# Patient Record
Sex: MALE | Race: BLACK OR AFRICAN AMERICAN
[De-identification: names, ages, dates, MRNs, and addresses within clinical notes are randomized per-mention and may not be internally consistent; named-entity substitution may affect disease eponyms.]

---

## 2018-01-13 ENCOUNTER — HOSPITAL ENCOUNTER (EMERGENCY)
Dept: HOSPITAL 57 - BURERS | Age: 43
Discharge: HOME | End: 2018-01-13
Payer: SELF-PAY

## 2018-01-13 DIAGNOSIS — N13.2: Primary | ICD-10-CM

## 2018-01-13 DIAGNOSIS — F17.210: ICD-10-CM

## 2018-01-13 LAB
ALBUMIN SERPL BCG-MCNC: 3.9 G/DL (ref 3.5–5)
ALP SERPL-CCNC: 89 U/L (ref 40–150)
ALT SERPL W P-5'-P-CCNC: 14 U/L (ref 8–55)
ANION GAP SERPL CALC-SCNC: 15 MMOL/L (ref 10–20)
APTT PPP: 25.2 SEC (ref 22.9–36.1)
AST SERPL-CCNC: 16 U/L (ref 5–34)
BACTERIA UR QL AUTO: (no result) HPF
BASOPHILS # BLD AUTO: 0 THOU/UL (ref 0–0.2)
BASOPHILS NFR BLD AUTO: 0.6 % (ref 0–1)
BILIRUB SERPL-MCNC: 0.3 MG/DL (ref 0.2–1.2)
BUN SERPL-MCNC: 10 MG/DL (ref 8.9–20.6)
CALCIUM SERPL-MCNC: 9 MG/DL (ref 7.8–10.44)
CHLORIDE SERPL-SCNC: 108 MMOL/L (ref 98–107)
CK MB SERPL-MCNC: 0.8 NG/ML (ref 0–6.6)
CO2 SERPL-SCNC: 22 MMOL/L (ref 22–29)
CREAT CL PREDICTED SERPL C-G-VRATE: 0 ML/MIN (ref 70–130)
EOSINOPHIL # BLD AUTO: 0.1 THOU/UL (ref 0–0.7)
EOSINOPHIL NFR BLD AUTO: 2.4 % (ref 0–10)
GLOBULIN SER CALC-MCNC: 3.9 G/DL (ref 2.4–3.5)
GLUCOSE SERPL-MCNC: 98 MG/DL (ref 70–105)
HGB BLD-MCNC: 14 G/DL (ref 14–18)
INR PPP: 1
LIPASE SERPL-CCNC: 23 U/L (ref 8–78)
LYMPHOCYTES # BLD AUTO: 0.6 THOU/UL (ref 1.2–3.4)
LYMPHOCYTES NFR BLD AUTO: 10.3 % (ref 21–51)
MCH RBC QN AUTO: 28.7 PG (ref 27–31)
MCV RBC AUTO: 83.3 FL (ref 80–94)
MONOCYTES # BLD AUTO: 0.3 THOU/UL (ref 0.11–0.59)
MONOCYTES NFR BLD AUTO: 5.6 % (ref 0–10)
NEUTROPHILS # BLD AUTO: 4.8 THOU/UL (ref 1.4–6.5)
NEUTROPHILS NFR BLD AUTO: 81.1 % (ref 42–75)
PLATELET # BLD AUTO: 138 THOU/UL (ref 130–400)
POTASSIUM SERPL-SCNC: 3.8 MMOL/L (ref 3.5–5.1)
PROT UR STRIP.AUTO-MCNC: 30 MG/DL
PROTHROMBIN TIME: 13.3 SEC (ref 12–14.7)
RBC # BLD AUTO: 4.89 MILL/UL (ref 4.7–6.1)
RBC UR QL AUTO: (no result) HPF (ref 0–3)
SODIUM SERPL-SCNC: 141 MMOL/L (ref 136–145)
SP GR UR STRIP: 1.02 (ref 1–1.03)
TROPONIN I SERPL DL<=0.01 NG/ML-MCNC: (no result) NG/ML (ref ?–0.03)
WBC # BLD AUTO: 5.9 THOU/UL (ref 4.8–10.8)
WBC UR QL AUTO: (no result) HPF (ref 0–3)

## 2018-01-13 PROCEDURE — 85610 PROTHROMBIN TIME: CPT

## 2018-01-13 PROCEDURE — 74176 CT ABD & PELVIS W/O CONTRAST: CPT

## 2018-01-13 PROCEDURE — 85730 THROMBOPLASTIN TIME PARTIAL: CPT

## 2018-01-13 PROCEDURE — 84484 ASSAY OF TROPONIN QUANT: CPT

## 2018-01-13 PROCEDURE — 94760 N-INVAS EAR/PLS OXIMETRY 1: CPT

## 2018-01-13 PROCEDURE — 82553 CREATINE MB FRACTION: CPT

## 2018-01-13 PROCEDURE — 81015 MICROSCOPIC EXAM OF URINE: CPT

## 2018-01-13 PROCEDURE — 96375 TX/PRO/DX INJ NEW DRUG ADDON: CPT

## 2018-01-13 PROCEDURE — 83690 ASSAY OF LIPASE: CPT

## 2018-01-13 PROCEDURE — 36415 COLL VENOUS BLD VENIPUNCTURE: CPT

## 2018-01-13 PROCEDURE — 81003 URINALYSIS AUTO W/O SCOPE: CPT

## 2018-01-13 PROCEDURE — 96374 THER/PROPH/DIAG INJ IV PUSH: CPT

## 2018-01-13 PROCEDURE — 85025 COMPLETE CBC W/AUTO DIFF WBC: CPT

## 2018-01-13 PROCEDURE — 80053 COMPREHEN METABOLIC PANEL: CPT

## 2018-01-13 NOTE — CT
CT OF THE ABDOMEN AND PELVIS WITHOUT CONTRAST:

 

Date:  01/13/18

 

Spiral CT of the abdomen and pelvis was done for evaluation of left lower quadrant pain and urinary u
rgency. Axial slices were acquired, then coronal reconstructions were done. 

 

FINDINGS:

Lung bases show some dependent atelectasis, but no lobar consolidations. The liver, spleen, pancreas,
 gallbladder, adrenal glands, and abdominal aorta are unremarkable. 

 

There is some very mild left hydronephrosis. Although difficult to see well, there does appear to be 
a very tiny, 2.0 mm, calculus in the distal left ureter, best seen on slice 87 of the axial images an
d slice 79 of the coronals. It is just a centimeter or two short of the left UPJ. No renal calculi ap
preciated. 

 

The bowel shows no distention or sign of obstruction or bowel wall thickening. There are no inflammat
ory changes around bowel. There has probably been a prior appendectomy. No free air or free fluid see
n. 

 

IMPRESSION: 

Presumed 2.0 mm distal left ureteral calculus near the UVJ causing mild left hydronephrosis. 

 

 

POS: HOME

## 2019-08-30 ENCOUNTER — HOSPITAL ENCOUNTER (OUTPATIENT)
Dept: HOSPITAL 92 - CT | Age: 44
Discharge: HOME | End: 2019-08-30
Attending: STUDENT IN AN ORGANIZED HEALTH CARE EDUCATION/TRAINING PROGRAM
Payer: COMMERCIAL

## 2019-08-30 DIAGNOSIS — R05: ICD-10-CM

## 2019-08-30 DIAGNOSIS — B20: Primary | ICD-10-CM

## 2019-08-30 PROCEDURE — 71046 X-RAY EXAM CHEST 2 VIEWS: CPT

## 2019-08-30 NOTE — RAD
RADIOGRAPH CHEST 2 VIEWS:



DATE:

8/30/2019



HISTORY:

43-year-old male with HIV presents with cough.



FINDINGS:

The lungs are clear. The cardiomediastinal silhouette and hilar shadows appear normal. There is no pl
eural effusion or pneumothorax. No osseous abnormality is identified.



IMPRESSION:

Normal



Reported By: Gerardo Holcomb 

Electronically Signed:  8/30/2019 10:21 AM

## 2019-09-05 ENCOUNTER — HOSPITAL ENCOUNTER (INPATIENT)
Dept: HOSPITAL 92 - ERS | Age: 44
LOS: 4 days | Discharge: HOME | DRG: 975 | End: 2019-09-09
Attending: FAMILY MEDICINE | Admitting: FAMILY MEDICINE
Payer: COMMERCIAL

## 2019-09-05 VITALS — BODY MASS INDEX: 23.1 KG/M2

## 2019-09-05 DIAGNOSIS — D64.9: ICD-10-CM

## 2019-09-05 DIAGNOSIS — E46: ICD-10-CM

## 2019-09-05 DIAGNOSIS — C46.9: ICD-10-CM

## 2019-09-05 DIAGNOSIS — F17.210: ICD-10-CM

## 2019-09-05 DIAGNOSIS — B35.0: ICD-10-CM

## 2019-09-05 DIAGNOSIS — Z90.49: ICD-10-CM

## 2019-09-05 DIAGNOSIS — L21.0: ICD-10-CM

## 2019-09-05 DIAGNOSIS — Z60.2: ICD-10-CM

## 2019-09-05 DIAGNOSIS — B20: Primary | ICD-10-CM

## 2019-09-05 LAB
ALBUMIN SERPL BCG-MCNC: 3.9 G/DL (ref 3.5–5)
ALP SERPL-CCNC: 84 U/L (ref 40–150)
ALT SERPL W P-5'-P-CCNC: 12 U/L (ref 8–55)
ANION GAP SERPL CALC-SCNC: 14 MMOL/L (ref 10–20)
AST SERPL-CCNC: 22 U/L (ref 5–34)
BASOPHILS # BLD AUTO: 0 THOU/UL (ref 0–0.2)
BASOPHILS NFR BLD AUTO: 0.4 % (ref 0–1)
BILIRUB SERPL-MCNC: 0.4 MG/DL (ref 0.2–1.2)
BUN SERPL-MCNC: 9 MG/DL (ref 8.9–20.6)
CALCIUM SERPL-MCNC: 9.7 MG/DL (ref 7.8–10.44)
CHLORIDE SERPL-SCNC: 104 MMOL/L (ref 98–107)
CO2 SERPL-SCNC: 22 MMOL/L (ref 22–29)
CREAT CL PREDICTED SERPL C-G-VRATE: 0 ML/MIN (ref 70–130)
EOSINOPHIL # BLD AUTO: 0.1 THOU/UL (ref 0–0.7)
EOSINOPHIL NFR BLD AUTO: 2.4 % (ref 0–10)
GLOBULIN SER CALC-MCNC: 6.4 G/DL (ref 2.4–3.5)
GLUCOSE SERPL-MCNC: 85 MG/DL (ref 70–105)
HGB BLD-MCNC: 10.8 G/DL (ref 14–18)
HGB BLD-MCNC: 11.9 G/DL (ref 14–18)
LYMPHOCYTES # BLD: 0.7 THOU/UL (ref 1.2–3.4)
LYMPHOCYTES NFR BLD AUTO: 22.7 % (ref 21–51)
MCH RBC QN AUTO: 28.2 PG (ref 27–31)
MCH RBC QN AUTO: 28.3 PG (ref 27–31)
MCV RBC AUTO: 81.8 FL (ref 78–98)
MCV RBC AUTO: 83.7 FL (ref 78–98)
MDIFF COMPLETE?: YES
MONOCYTES # BLD AUTO: 0.4 THOU/UL (ref 0.11–0.59)
MONOCYTES NFR BLD AUTO: 12.2 % (ref 0–10)
MUCOUS THREADS UR QL AUTO: (no result) LPF
NEUTROPHILS # BLD AUTO: 2 THOU/UL (ref 1.4–6.5)
NEUTROPHILS NFR BLD AUTO: 62.3 % (ref 42–75)
PLATELET # BLD AUTO: 213 THOU/UL (ref 130–400)
PLATELET # BLD AUTO: 228 THOU/UL (ref 130–400)
POTASSIUM SERPL-SCNC: 4 MMOL/L (ref 3.5–5.1)
PROT UR STRIP.AUTO-MCNC: 30 MG/DL
RBC # BLD AUTO: 3.84 MILL/UL (ref 4.7–6.1)
RBC # BLD AUTO: 4.21 MILL/UL (ref 4.7–6.1)
SODIUM SERPL-SCNC: 136 MMOL/L (ref 136–145)
WBC # BLD AUTO: 3.2 THOU/UL (ref 4.8–10.8)
WBC # BLD AUTO: 4.2 THOU/UL (ref 4.8–10.8)
WBC UR QL AUTO: (no result) HPF (ref 0–3)

## 2019-09-05 PROCEDURE — 84165 PROTEIN E-PHORESIS SERUM: CPT

## 2019-09-05 PROCEDURE — 70450 CT HEAD/BRAIN W/O DYE: CPT

## 2019-09-05 PROCEDURE — 80053 COMPREHEN METABOLIC PANEL: CPT

## 2019-09-05 PROCEDURE — 87040 BLOOD CULTURE FOR BACTERIA: CPT

## 2019-09-05 PROCEDURE — 87340 HEPATITIS B SURFACE AG IA: CPT

## 2019-09-05 PROCEDURE — 85652 RBC SED RATE AUTOMATED: CPT

## 2019-09-05 PROCEDURE — 86803 HEPATITIS C AB TEST: CPT

## 2019-09-05 PROCEDURE — 86480 TB TEST CELL IMMUN MEASURE: CPT

## 2019-09-05 PROCEDURE — 87385 HISTOPLASMA CAPSUL AG IA: CPT

## 2019-09-05 PROCEDURE — 87116 MYCOBACTERIA CULTURE: CPT

## 2019-09-05 PROCEDURE — 84145 PROCALCITONIN (PCT): CPT

## 2019-09-05 PROCEDURE — 36415 COLL VENOUS BLD VENIPUNCTURE: CPT

## 2019-09-05 PROCEDURE — 85025 COMPLETE CBC W/AUTO DIFF WBC: CPT

## 2019-09-05 PROCEDURE — 83605 ASSAY OF LACTIC ACID: CPT

## 2019-09-05 PROCEDURE — 81015 MICROSCOPIC EXAM OF URINE: CPT

## 2019-09-05 PROCEDURE — 86705 HEP B CORE ANTIBODY IGM: CPT

## 2019-09-05 PROCEDURE — 85060 BLOOD SMEAR INTERPRETATION: CPT

## 2019-09-05 PROCEDURE — 93005 ELECTROCARDIOGRAM TRACING: CPT

## 2019-09-05 PROCEDURE — 87521 HEPATITIS C PROBE&RVRS TRNSC: CPT

## 2019-09-05 PROCEDURE — 87899 AGENT NOS ASSAY W/OPTIC: CPT

## 2019-09-05 PROCEDURE — 87206 SMEAR FLUORESCENT/ACID STAI: CPT

## 2019-09-05 PROCEDURE — 81003 URINALYSIS AUTO W/O SCOPE: CPT

## 2019-09-05 PROCEDURE — 86140 C-REACTIVE PROTEIN: CPT

## 2019-09-05 PROCEDURE — 86777 TOXOPLASMA ANTIBODY: CPT

## 2019-09-05 PROCEDURE — 86706 HEP B SURFACE ANTIBODY: CPT

## 2019-09-05 RX ADMIN — ONDANSETRON PRN MG: 2 INJECTION INTRAMUSCULAR; INTRAVENOUS at 22:52

## 2019-09-05 SDOH — SOCIAL STABILITY - SOCIAL INSECURITY: PROBLEMS RELATED TO LIVING ALONE: Z60.2

## 2019-09-05 NOTE — CT
CT head noncontrast



HISTORY: Dizziness. Altered mental status.



FINDINGS: There is no evidence of acute intracranial hemorrhage or infarct. The ventricles appear nor
mal in size, shape and position. There is no mass effect or shift of midline structures. Visualized

paranasal sinuses remain well-aerated.







IMPRESSION: No acute intracranial abnormalities are demonstrated.



Reported By: EDGAR Beatty 

Electronically Signed:  9/5/2019 7:49 PM

## 2019-09-05 NOTE — PDOC.FPRHP
- History of Present Illness


Chief Complaint: generalized weakness 


History of Present Illness: 





42 y/o m, recently diagnosed with HIV, CD4 count of 36, presents to the ED 

after having gradual increasing weakness and dizziness. 


Pt states he found out he had HIV last Friday. He plans to tell his family of 

his diagnosis tomorrow (9/7). 


C/o body aches, muscle cramps, constipation, nausea, dizziness and SOB. 


He states he has lost weight of 30 pounds. 


Pt states he has had sexual intercourse with men since the age of 19. He had a 

high risk partner starting X2 years ago that was incarcerated for X15 years. He 

admits to unprotected intercourse with him X1 time. 


He states he was screened for HIV in 2018 and Feb 2019 with negative results. 


Pt states he received his anti-retrovirals yesterday but has not started taking 

them yet. He has also been on bactrim this week for ppx. 


He c/o of a scaling rash in his beard and hairline that got worse with the 

bactrim use.  





- Allergies/Adverse Reactions


 Allergies











Allergy/AdvReac Type Severity Reaction Status Date / Time


 


No Known Drug Allergies Allergy   Verified 09/06/19 01:12














- Home Medications


 











 Medication  Instructions  Recorded  Confirmed  Type


 


Lamivudine/Tenofovir Disop Fum 1 tab DAILY 09/05/19 09/06/19 History





[Cimduo 300-300 mg Tablet]    


 


Promethazine [Phenergan] 1 tab ASDIR PRN 09/05/19 09/06/19 History


 


Sulfamethoxazole/Trimethoprim 1 tab BID 09/05/19 09/05/19 History





[Bactrim DS]    














- History


PMHx: benign. Recent HIV diagnosis, CD4 count 36


 


PSHx: Appendectomy, ureter stent placement 





FHx: mother and father both had HTN and DM 


 


Social: female and male sexual encounters. X10 male lifetime sexual partners. 

Several tattoos. Denies IVDA, states he smoke occasional marijuana and used 

meth by smoking one time last year. 


 








- Review of Systems


General: reports: fever/chills, weight/appetite/sleep changes (30 lbs since May 

2019), night sweats, fatigue


Eyes: denies: eye pain, vision changes


ENT: denies: nasal congestion, rhinorrhea


Respiratory: reports: shortness of breath, exercise intolerance.  denies: cough

, congestion


Cardiovascular: denies: chest pain, palpitation, edema


Gastrointestinal: reports: nausea, constipation, abdominal pain.  denies: 

vomiting, diarrhea


Genitourinary: denies: incontinence, dysuria, polyuria


Skin: reports: rashes (scaling rash in beard and hairline), lesions, itching


Musculoskeletal: reports: pain, arthritis/arthralgias


Neurological: reports: weakness.  denies: numbness, syncope, seizure





- Vital signs


BP: 126/88  HR: 108 RR: 33 Tmax: 98.0 Pox: 99% on RA  Wt: 71 kg    








- Physical Exam


Constitutional: NAD, awake, alert and oriented, other (chachetic)


HEENT: normocephalic and atraumatic, PERRLA, EOMI, conjunctiva clear, no 

scleral icterus, grossly normal vision, grossly normal hearing, MMM, oropharynx 

clear, other (poor dentition)


Neck: supple, FROM, trachea midline, no JVD


Chest: no-tender to palpation, other (0.5-1 cm circular lesions scattered over 

chest and back.)


Heart: RRR, normal S1/S2, no murmurs/rubs/gallops, pulses present, no edema


Lungs: no respiratory distress, good air movement, no wheezing, no retractions, 

other (Fine Crackles in right lower lobe)


Abdomen: soft, non-tender, bowel sounds present, no masses/distention, no 

hernias


Musculoskeletal: normal structure, normal tone, ROM grossly normal


Neurological: no focal deficit, CN II-XII intact, normal sensation


Skin: good turgor, capillary refill <2 seconds, no jaundice, other (0.5-1 cm 

circular macular and erythematous lesions scattered over back, chest, and 

extremities.)


-Skin: 





scalp and under beard erythmatous with overlying scaling skin rash. 


Heme/Lymphatic: no unusual bruising or bleeding, no purpura, no petechia


Psychiatric: normal mood and affect, good judgment and insight, intact recent 

and remote memory





FMR H&P: Results





- Labs


Result Diagrams: 


 09/05/19 23:15





 09/05/19 17:44


Lab results: 


 











WBC  3.2 thou/uL (4.8-10.8)  L  09/05/19  17:44    


 


Hgb  11.9 g/dL (14.0-18.0)  L  09/05/19  17:44    


 


Hct  35.2 % (42.0-52.0)  L  09/05/19  17:44    


 


MCV  83.7 fL (78.0-98.0)   09/05/19  17:44    


 


Plt Count  228 thou/uL (130-400)   09/05/19  17:44    


 


Neutrophils %  62.3 % (42.0-75.0)   09/05/19  17:44    


 


Sodium  136 mmol/L (136-145)   09/05/19  17:44    


 


Potassium  4.0 mmol/L (3.5-5.1)   09/05/19  17:44    


 


Chloride  104 mmol/L ()   09/05/19  17:44    


 


Carbon Dioxide  22 mmol/L (22-29)   09/05/19  17:44    


 


BUN  9 mg/dL (8.9-20.6)   09/05/19  17:44    


 


Creatinine  0.78 mg/dL (0.7-1.3)   09/05/19  17:44    


 


Glucose  85 mg/dL ()   09/05/19  17:44    


 


Calcium  9.7 mg/dL (7.8-10.44)   09/05/19  17:44    


 


Total Bilirubin  0.4 mg/dL (0.2-1.2)   09/05/19  17:44    


 


AST  22 U/L (5-34)   09/05/19  17:44    


 


ALT  12 U/L (8-55)   09/05/19  17:44    


 


Alkaline Phosphatase  84 U/L ()   09/05/19  17:44    


 


Serum Total Protein  10.3 g/dL (6.0-8.3)  H  09/05/19  17:44    


 


Albumin  3.9 g/dL (3.5-5.0)   09/05/19  17:44    


 


Urine Ketones  Negative mg/dL (Negative)   09/05/19  17:39    


 


Urine Blood  1+  (Negative)  A  09/05/19  17:39    


 


Urine Nitrite  Negative  (Negative)   09/05/19  17:39    


 


Ur Leukocyte Esterase  Negative Bj/uL (Negative)   09/05/19  17:39    


 


Urine RBC  11-20 HPF (0-3)  A  09/05/19  17:39    


 


Urine WBC  0-3 HPF (0-3)   09/05/19  17:39    


 


Ur Squamous Epith Cells  None Seen HPF (0-3)   09/05/19  17:39    


 


Urine Bacteria  None Seen HPF (None Seen)   09/05/19  17:39    








CD4 count 36 





- Radiology Interpretation


  ** CT scan - head


Status: report reviewed by me (no acute findings.)





FMR H&P: A/P





- Problem List


(1) HIV-1 (human immunodeficiency virus I)


Current Visit: Yes   Status: Acute   Code(s): B20 - HUMAN IMMUNODEFICIENCY 

VIRUS [HIV] DISEASE   





(2) AIDS (acquired immune deficiency syndrome)


Current Visit: Yes   Status: Acute   





(3) Kaposi sarcoma


Current Visit: Yes   Status: Suspected   Code(s): C46.9 - KAPOSI'S SARCOMA, 

UNSPECIFIED   





(4) Problems related to high-risk sexual behavior


Current Visit: Yes   Status: Acute   Code(s): Z72.51 - HIGH RISK HETEROSEXUAL 

BEHAVIOR   





(5) Superficial fungal infection of skin


Current Visit: Yes   Status: Acute   Code(s): B36.9 - SUPERFICIAL MYCOSIS, 

UNSPECIFIED   





- Plan





42 y/o M admitted to inpatient medical for evaluation of recent HIV diagnosis 

with low CD4 count.





1. HIV 


- Recent diagnosis 


- CD4 count 36 at [facility] lab 





2. AIDS 


- CD4 count 


- Bactrim and Aithromycin PPx 


- Ordered serum protein electrophoresis, CBC with smear, EAR and CRP to work up 

possible malignancy in immunocompromised state


- Pt needs thorough lymph node exam to evaluate for possible lymphoma 





3. Kaposi Sarcoma 


- suspected lesions scattered diffusely. 





4. High risk sexual behavior with men


-Ordered labs for Hep B, Hep C


- RPR drawn in clinic, negative 


- HIV type 1 positive. 





5. Tinea Capitus 


- Fungal infection of scalp and beard skin. 


- Topical antifungal Nystatin cream 





Code Status: full code 


Diet: Regular diet 


DVT ppx: SCD's 





Disposition/LOS: 





Pt stable 


anticipated at least 2 midnights inpatient 





FMR H&P: Upper Level





- Pertinent history





42 yo male with recent diagnosis of HIV presents with loss of appetite, 

subjective fevers at home, and overall feeling unwell. Please see intern note 

above for further information.





General: Cachectic male lying in bed, NAD


CV: RRR, no murmurs


Respiratory: CTA, no wheezing


Extremities: no edema, moves all four equally.


Skin: erythematous and scaling lesion to top of head. Scattered purple lesions 

over body. 


Psych: Anxious mood with congruent affect, tearful at times


Neuro: No focal deficits





- Plan


Date/Time: 09/05/19 2768








I, Hiram Palacios MD, have evaluated this patient and agree with findings/

plan as outlined by intern resident. Pertinent changes/additions are listed 

here.








1. AIDS 


- Will check SPEP and Hep C to determine other co-morbidities


- Consult I&D in AM


- Initiated prophylactic antibiotics





2. Anemia


- Likely chronic


- Monitor for signs and symptoms of bleeding. 


- Previously failed colonoscopy due to inadequate prep





CODE STATUS: FULL CODE





PCP: DIOGO





Disposition: Stable, will admit for further evaluation. 





Addendum - Attending





- Attending Attestation


Date/Time: 09/05/19 3521





I personally evaluated the patient and discussed the management with Dr. Reyna/

Suzanne. 


I agree with the History, Examination, Assessment and Plan documented above 

with any addition or exceptions noted below.





Patient here with progressive fatigue, night sweats, weight loss, anorexia, 

body aches, and intermittent fevers. Diagnosed in our clinic 1 week ago with HIV

, found to have CD4 count of near 30. He has not started HAART therapy yet. 

Presents due to progressive symptoms. Exam shows cachexia and scattered skin 

abnormalities that are overall stable from his clinic visit 1 week ago. He had 

CXR done outpatient that did not show evidence for pulmonary infection such as 

PJP or cavitary lesion. Patient lab work shows mild leukopenia, elevated total 

protein and globulin. Patient to be admitted for acute on chronic immune 

compromise due to AIDS. ID to be consulted. Will obtain cultures to screen for 

infection, PCT. Start Bactrim and Azithromycin for ppx but will expand to broad 

spectrum abx if any source becomes apparent. He will also need peripheral smear 

and excellent lymph node exam to screen for hematological malignancy such as 

lymphoma. Will order SPEP due to increased globulins as well as ESR/CRP. Obtain 

TB testing. Primary concerns at this time would be for either infection in 

setting of severe immune compromised status or malignancy. Will need dietary 

and nutrition recs as he has protein/calorie malnutrition and cachexia/wasting 

from chronic disease. No current evidence for viral or candidal esophagitis. 

Will also screen for other comorbid conditions such as HepC. He was negative 

for Syphilis in our clinic last week.

## 2019-09-06 LAB
ALBUMIN SERPL BCG-MCNC: 3.4 G/DL (ref 3.5–5)
ALP SERPL-CCNC: 71 U/L (ref 40–150)
ALT SERPL W P-5'-P-CCNC: 12 U/L (ref 8–55)
ANION GAP SERPL CALC-SCNC: 11 MMOL/L (ref 10–20)
AST SERPL-CCNC: 20 U/L (ref 5–34)
BILIRUB SERPL-MCNC: 0.3 MG/DL (ref 0.2–1.2)
BUN SERPL-MCNC: 11 MG/DL (ref 8.9–20.6)
CALCIUM SERPL-MCNC: 8.9 MG/DL (ref 7.8–10.44)
CHLORIDE SERPL-SCNC: 104 MMOL/L (ref 98–107)
CO2 SERPL-SCNC: 24 MMOL/L (ref 22–29)
CREAT CL PREDICTED SERPL C-G-VRATE: 132 ML/MIN (ref 70–130)
GLOBULIN SER CALC-MCNC: 5.2 G/DL (ref 2.4–3.5)
GLUCOSE SERPL-MCNC: 84 MG/DL (ref 70–105)
HBCM INDEX: 0.09 S/CO (ref 0–0.79)
HBSAG INDEX: 0.17 S/CO (ref 0–0.99)
HBV SURFACE AB SERPL IA-ACNC: 1.9 MIU/ML
HEP C INDEX: 0.12 S/CO (ref 0–0.79)
HGB BLD-MCNC: 10.4 G/DL (ref 14–18)
MCH RBC QN AUTO: 28.6 PG (ref 27–31)
MCV RBC AUTO: 82.8 FL (ref 78–98)
MDIFF COMPLETE?: YES
PLATELET # BLD AUTO: 196 THOU/UL (ref 130–400)
POLYCHROMASIA BLD QL SMEAR: (no result) (100X)
POTASSIUM SERPL-SCNC: 4 MMOL/L (ref 3.5–5.1)
RBC # BLD AUTO: 3.65 MILL/UL (ref 4.7–6.1)
SODIUM SERPL-SCNC: 135 MMOL/L (ref 136–145)
WBC # BLD AUTO: 2.4 THOU/UL (ref 4.8–10.8)

## 2019-09-06 RX ADMIN — ALUMINUM ZIRCONIUM TRICHLOROHYDREX GLY SCH EACH: 0.2 STICK TOPICAL at 08:39

## 2019-09-06 NOTE — CON
DATE OF CONSULTATION:  09/06/2019



REASON FOR CONSULTATION:  New HIV infection with symptoms.



HISTORY OF PRESENT ILLNESS:  A 43-year-old, history of worsening weakness following

recent identification of HIV seropositive status.  He also has a noticeable weight

loss for about 30 pounds over the past few weeks to months and he was seen in the

[facility] and after being prescribed his treatments, he developed

worsening of his weakness sensation and he was eventually admitted for management.

He has intermittent headaches, some dizziness when he is standing up, nausea

intermittently.  No visual symptoms, odynophagia, dysphagia, no sore throat, no

cough or sputum production, chest pain, no back pain.  He has sensation of shooting

pains in lower extremities and intermittent weakness.  No genitourinary symptoms.

No diarrhea.  No constipation.  Had some bleeding, which he blames it on anal

fissures. 



PAST MEDICAL HISTORY:  HIV seropositive status, recently identified.  Previous HIV

test was done in [place] reportedly, was an oral swab which was negative.  History of

nephrolithiasis with prior ureteral stent placement, appendectomy.  Denies any

history of syphilis or hepatitis diagnosis. 



SOCIAL HISTORY:  He lives in [place], works in a nursing home, and never did IV

drugs, once used methamphetamine by smoking.  He is bisexual. 



CURRENT MEDICATIONS:  

1. Tylenol.

2. Zithromax.

3. Tessalon.

4. Antivert.

5. Mycostatin.

6. Zofran.

7. Cimduo.

8. Bactrim.



PHYSICAL EXAMINATION:

VITAL SIGNS:  Temperature normal, /69, pulse 80, respirations 18, O2

saturation 99. 

SKIN:  Shows seborrheic dermatosis in the facial area.  No lymphadenopathy.

Peripheral IV access. 

HEENT:  Ocular movements conjugate.  Sclerae, white.  Pupils are equal.

Conjunctivae, normal.  Oral cavity, without any abnormalities.  Teeth in good shape. 

NECK:  Supple.  No jugular vein distention or carotid bruits. 

LUNGS:  Symmetric.  Clear breath sounds. 

HEART:  S1 and S2, regular rate.  No S3 or S4. 

ABDOMEN:  Soft, not distended or tender.  No ascites.  No bladder distention. 

:  No genital abnormalities.  Perianal area is normal except for a few skin tags. 

EXTREMITIES:  No joint inflammatory activity.  Strength in upper and lower

extremities is 5/5.  Pulses 1+ in dorsalis pedis.  No edema.  Plantar responses are

flexor.  No clonus. 

NEUROLOGIC:  Cognitive function appears to be intact.



LABORATORY DATA:  White cell count 3.2, hemoglobin 11.9, platelets 228 with 62%

neutrophils.  Chemistry, normal except for serum total protein of 10.3, CRP 0.5,

albumin 3.9.  Procalcitonin 0.03.  Urinalysis, 0 to 3 wbc's, 11 to 20 rbc's.

Hepatitis serology negative.  Reportedly, his CD4 cell count was 30, but I do not

have confirmation of that yet. 



IMAGING STUDIES:  Chest x-ray with normal findings.  Abdomen and pelvis CT with 2 mm

distal ureteral calculus, mild left hydronephrosis.  Brain CT was normal. 



ASSESSMENT:  Newly diagnosed human immunodeficiency virus seropositive status with

evidence of advanced immunosuppression.  Reportedly, CD4 cell count was in the 30s,

but I need to confirm that.  He is symptomatic with weight loss, seborrheic

dermatitis, nausea and some neuropathic symptoms in the lower extremities.  He will

need to start Cimduo ASAP.  We will decrease his Bactrim intake to one

double-strength, Mondays, Wednesdays, Fridays.  No other prophylaxis needed.  Submit

OI, opportunistic infection assays including CMV, DNA, PCR, cryptococcus antigen,

Mycobacterium avium complex, blood cultures, histoplasma antigen.  We will check his

toxo IgG antibody level as well.  May need a spinal fluid evaluation depending on

clinical progress and the results of the cryptococcus antigen and CMV, DNA, PCR. 





Job ID:  [id number]

## 2019-09-06 NOTE — PDOC.FM
- Subjective


Subjective: 





Patient doing well this morning. Reports that he has been having night sweats 

for a few months. Reports of a recent fever at home a few days ago of 102.1. 

Has also been having a dry cough. 





- Objective


Vital Signs & Weight: 


 Vital Signs (12 hours)











  Temp Pulse Resp BP Pulse Ox


 


 09/06/19 04:00  97.6 F  87  18  101/69  99


 


 09/05/19 22:41  98.0 F  102 H  20  102/67  99








 Weight











Weight                         71 kg














Result Diagrams: 


 09/06/19 07:20





 09/06/19 07:20





Phys Exam





- Physical Examination


Constitutional: NAD


clammy


HEENT: moist MMs, sclera anicteric


Neck: no nodes, full ROM


Respiratory: no wheezing, clear to auscultation bilateral


Cardiovascular: RRR, no significant murmur


Gastrointestinal: soft, non-tender


Musculoskeletal: no edema, pulses present


Neurological: normal sensation, moves all 4 limbs


no axillary, femoral, or cervical nodes appreciated on exam


Psychiatric: normal affect, A&O x 3


Skin: normal turgor, cap refill <2 seconds


Deviation from normal: spots throughout back and arms





Dx/Plan


(1) AIDS (acquired immune deficiency syndrome)


Status: Acute   





(2) HIV-1 (human immunodeficiency virus I)


Code(s): B20 - HUMAN IMMUNODEFICIENCY VIRUS [HIV] DISEASE   Status: Acute   





(3) Problems related to high-risk sexual behavior


Code(s): Z72.51 - HIGH RISK HETEROSEXUAL BEHAVIOR   Status: Acute   





(4) Superficial fungal infection of skin


Code(s): B36.9 - SUPERFICIAL MYCOSIS, UNSPECIFIED   Status: Acute   





(5) Kaposi sarcoma


Code(s): C46.9 - KAPOSI'S SARCOMA, UNSPECIFIED   Status: Suspected   





- Plan


Plan: 





42 y/o M admitted to inpatient medical for evaluation of recent HIV diagnosis 

with low CD4 count.





# HIV 


- Recent diagnosis, family is NOT aware of diagnosis but patient plans on 

telling them today


- CD4 count 36 at [facility] lab 


- Has home Cimduo 300mg, has not started taking yet. Will start today





# AIDS 


- CD4 count 


- Bactrim and Arthromycin PPx 


- Serum protein electrophoresis, CBC with smear to work up possible malignancy 

in immunocompromised state


- ESR elevated at 66


- TB quant drawn


- Dr. Booth, ID, has been consulted, appreciate recs


- Brain CT negative








# Kaposi Sarcoma 


- suspected


- lesions scattered diffusely throughout back and arms





# High risk sexual behavior with men


- Hep B, Hep C negative


- RPR drawn in clinic, negative 


- HIV type 1 positive. 





# Tinea Capitus 


- Fungal infection of scalp and beard skin. 


- Topical antifungal Nystatin cream 





Code Status: full code 


Diet: Regular diet 


DVT ppx: SCD's 


Dispo: inpatient for HIV/AIDS related testing, following Dr. Booth recs





Addendum - Attending





- Attending Attestation


Date/Time: 09/06/19 1939





I personally evaluated the patient and discussed the management with Dr. Hall.


I agree with the History, Examination, Assessment and Plan documented above 

with any addition or exceptions noted below.


Starting antiretroviral treatment.  Pt on azithromycin and bactrim.  Dr. Booth 

has been consulted and recs are appreciated.  CD4 count is low and viral load 

is high.  Will add symptomatic meds for cough, dizziness.

## 2019-09-07 RX ADMIN — ALUMINUM ZIRCONIUM TRICHLOROHYDREX GLY SCH EACH: 0.2 STICK TOPICAL at 08:25

## 2019-09-07 RX ADMIN — ONDANSETRON PRN MG: 2 INJECTION INTRAMUSCULAR; INTRAVENOUS at 00:56

## 2019-09-07 RX ADMIN — ONDANSETRON PRN MG: 2 INJECTION INTRAMUSCULAR; INTRAVENOUS at 17:39

## 2019-09-07 NOTE — EKG
Test Reason : 

Blood Pressure : ***/*** mmHG

Vent. Rate : 098 BPM     Atrial Rate : 098 BPM

   P-R Int : 156 ms          QRS Dur : 070 ms

    QT Int : 332 ms       P-R-T Axes : 078 075 051 degrees

   QTc Int : 423 ms

 

Normal sinus rhythm

Moderate voltage criteria for LVH, may be normal variant

Borderline ECG

 

Confirmed by ALFRED COLLADO, LOUIE YAN ([id number]),  FELECIA BACON ([id number]) on 9/7/2019 10:32:45 PM

 

Referred By:             Confirmed By:LOUIE SIMON MD

## 2019-09-07 NOTE — PDOC.FM
- Subjective


Subjective: 





Patient doing well this morning. Reportedly told his cousin about his diagnosis

, and plans to tell the rest of his family today. Had night sweats again last 

night, and has had some diarrhea though he thinks it's due to the food he ate 

yesterday. Will let me know today if it continues.  Began taking cimduo 

yesterday. Agreeable to current plan of care. 











- Objective


Vital Signs & Weight: 


 Vital Signs (12 hours)











  Temp Pulse Resp BP Pulse Ox


 


 09/07/19 05:00  97.7 F  69  20  102/68  97


 


 09/07/19 00:00  98.3 F  76  20  115/74  100


 


 09/06/19 19:23  98.6 F  93  20  106/65  100








 Weight











Admit Weight                   71 kg


 


Weight                         71 kg














Result Diagrams: 


 09/06/19 07:20





 09/06/19 07:20





Phys Exam





- Physical Examination


HEENT: moist MMs, sclera anicteric


Neck: supple, full ROM


Respiratory: no wheezing, clear to auscultation bilateral


Cardiovascular: RRR, no significant murmur


Gastrointestinal: soft, non-tender


Musculoskeletal: no edema, pulses present


Neurological: normal sensation, moves all 4 limbs


Lymphatic: no nodes


Psychiatric: normal affect, A&O x 3


Skin: normal turgor, cap refill <2 seconds


Deviation from normal: moist clammy skin, lesions on arms and back





Dx/Plan


(1) AIDS (acquired immune deficiency syndrome)


Status: Acute   





(2) HIV-1 (human immunodeficiency virus I)


Code(s): B20 - HUMAN IMMUNODEFICIENCY VIRUS [HIV] DISEASE   Status: Acute   





(3) Problems related to high-risk sexual behavior


Code(s): Z72.51 - HIGH RISK HETEROSEXUAL BEHAVIOR   Status: Acute   





(4) Superficial fungal infection of skin


Code(s): B36.9 - SUPERFICIAL MYCOSIS, UNSPECIFIED   Status: Acute   





(5) Kaposi sarcoma


Code(s): C46.9 - KAPOSI'S SARCOMA, UNSPECIFIED   Status: Suspected   





- Plan


Plan: 





42 y/o M admitted to inpatient medical for evaluation of recent HIV diagnosis 

with low CD4 count.





# HIV 


- Recent diagnosis, family is NOT aware of diagnosis but patient plans on 

telling them today


- CD4 count 36 at [facility] lab 


- Has home Cimduo 300mg, started 9/6


- HIV genotyping in clinic shows pan-susceptible to anti-retrovirals





# AIDS 


- CD4 count 36, viral count 310,000 per clinic labs


- Bactrim MWF PPx 


- Serum protein electrophoresis


- CBC smear: normocytic normochromic anemia, some Pulger-Huet morphology and 

rare metamyelocyte left shift


- ESR elevated at 66


- CRP wnl at 0.5


- TB quant 


- cryptococcus antigen negative


- CMV, AFB, histoplasma pending


- Dr. Booth, ID, has been consulted, appreciate recs


   -d/c azithromycin 9/6, transition Bactrim to [facility]


   -MAC, AFP, PCR, histoplasma, cryptococcus, PCR, CMV, Toxo IgG, possible 

spinal evaluation pending clinical progression and lab results


- Brain CT negative


- Blood Cx ngtd








# Kaposi Sarcoma 


- suspected


- lesions scattered diffusely throughout back and arms





# High risk sexual behavior with men


- Hep B, Hep C negative


- RPR drawn in clinic, negative 


- HIV type 1 positive. 





# Tinea Capitus 


- Fungal infection of scalp and beard skin. 


- Topical antifungal Nystatin cream 





Code Status: full code 


Diet: Regular diet 


DVT ppx: SCD's 


Dispo: inpatient for HIV/AIDS related testing, following Dr. Booth recs





Addendum - Attending





- Attending Attestation


Date/Time: 09/07/19 5890





I personally evaluated the patient and discussed the management with Dr. Hall.


I agree with the History, Examination, Assessment and Plan documented above 

with any addition or exceptions noted below.


The patient is feeling a little better.  Will get physical therapy on board.  

He started cimduo yesterday.  Multiple labs pending per Dr. Booth.

## 2019-09-08 RX ADMIN — ONDANSETRON PRN MG: 2 INJECTION INTRAMUSCULAR; INTRAVENOUS at 21:23

## 2019-09-08 RX ADMIN — ALUMINUM ZIRCONIUM TRICHLOROHYDREX GLY SCH EACH: 0.2 STICK TOPICAL at 07:41

## 2019-09-08 RX ADMIN — ONDANSETRON PRN MG: 2 INJECTION INTRAMUSCULAR; INTRAVENOUS at 12:13

## 2019-09-08 NOTE — PDOC.FM
- Subjective


Subjective: 





Patient afebrile overnight. Doing well this morning, dizziness has improved. 

Ate better yesterday, most of his dinner. Reports of headache this morning, not 

severe. 





- Objective


Vital Signs & Weight: 


 Vital Signs (12 hours)











  Temp Pulse Resp BP Pulse Ox


 


 09/08/19 04:00  98.2 F  88  20  100/74  99


 


 09/07/19 19:35  97.6 F  85  20  108/74  100


 


 09/07/19 17:45  98.2 F  75  18  122/81  100








 Weight











Admit Weight                   71 kg


 


Weight                         71 kg














I&O: 


 











 09/06/19 09/07/19 09/08/19





 06:59 06:59 06:59


 


Intake Total  970 


 


Balance  970 











Result Diagrams: 


 09/06/19 07:20





 09/06/19 07:20





Phys Exam





- Physical Examination


Constitutional: NAD


HEENT: moist MMs, sclera anicteric


Neck: supple, full ROM


Respiratory: no wheezing, clear to auscultation bilateral


Cardiovascular: RRR, no significant murmur


Gastrointestinal: soft, non-tender


Musculoskeletal: no edema, pulses present


Neurological: normal sensation, moves all 4 limbs


Lymphatic: no nodes


Psychiatric: normal affect, A&O x 3


Skin: normal turgor, cap refill <2 seconds





Dx/Plan


(1) AIDS (acquired immune deficiency syndrome)


Status: Acute   





(2) HIV-1 (human immunodeficiency virus I)


Code(s): B20 - HUMAN IMMUNODEFICIENCY VIRUS [HIV] DISEASE   Status: Acute   





(3) Problems related to high-risk sexual behavior


Code(s): Z72.51 - HIGH RISK HETEROSEXUAL BEHAVIOR   Status: Acute   





(4) Superficial fungal infection of skin


Code(s): B36.9 - SUPERFICIAL MYCOSIS, UNSPECIFIED   Status: Acute   





(5) Kaposi sarcoma


Code(s): C46.9 - KAPOSI'S SARCOMA, UNSPECIFIED   Status: Suspected   





- Plan


Plan: 





44 y/o M admitted to inpatient medical for evaluation of recent HIV diagnosis 

with low CD4 count.





# HIV 


- Recent diagnosis, family is NOT aware of diagnosis but patient plans on 

telling them 


- CD4 count 36 at [facility] lab, labs now in patient's paper chart


- Has home Cimduo 300mg, started 9/6


- HIV genotyping in clinic shows pan-susceptible to anti-retrovirals





# AIDS 


- CD4 count 36, viral count 310,000 per clinic labs, in paper chart


- afebrile overnight


- Bactrim MWF PPx 


- Serum protein electrophoresis


- CBC smear: normocytic normochromic anemia, some Pulger-Huet morphology and 

rare metamyelocyte left shift


- ESR elevated at 66


- CRP wnl at 0.5


- TB quant 


- cryptococcus antigen negative


- CMV, AFB, histoplasma pending


- Dr. Booth, ID, has been consulted, appreciate recs


   -d/c azithromycin 9/6, transition Bactrim to MWF


   -MAC, AFP, PCR, histoplasma, cryptococcus, PCR, CMV, Toxo IgG, possible 

spinal evaluation pending clinical progression and lab results


- Brain CT negative


- Blood Cx ngtd








# Kaposi Sarcoma 


- suspected


- lesions scattered diffusely throughout back and arms





# High risk sexual behavior with men


- Hep B, Hep C negative


- RPR drawn in clinic, negative 


- HIV type 1 positive. 





# Tinea Capitus 


- Fungal infection of scalp and beard skin. 


- Topical antifungal Nystatin cream 





Code Status: full code 


Diet: Regular diet 


DVT ppx: SCD's 


Dispo: inpatient for HIV/AIDS related testing, following Dr. Booth recs





Addendum - Attending





- Attending Attestation


Date/Time: 09/08/19 0602





I personally evaluated the patient and discussed the management with Dr. Hall.


I agree with the History, Examination, Assessment and Plan documented above 

with any addition or exceptions noted below.


The patient notes his dizziness is improved.  He still has a mild headache and 

just received tylenol.  Cultures so far are negative.  Several labs still 

pending.  Pt has multiple questions regarding his diagnosis and expected course.

## 2019-09-08 NOTE — PRG
DATE OF SERVICE:  09/08/2019



SUBJECTIVE:  Mr. Price is feeling better.  Appetite has improved.  He is able to eat.

 No abdominal pain.  No diarrhea.  Still has moderate headaches.  No visual

symptoms.  No dyspnea.  No cough. 



OBJECTIVE:  VITAL SIGNS:  He has been afebrile.  /75, pulse 82, respirations

16, O2 saturation 100%. 

GENERAL:  Appears in no distress. 

HEENT:  Ocular movements normal. 

LUNGS:  Clear. 

HEART:  S1, S2.  Regular rate. 

ABDOMEN:  Soft, not distended. 

MUSCULOSKELETAL:  No joint inflammatory activity.



LABORATORY DATA:  White cell count 2.4, hemoglobin 10.4, platelets 196, 40%

neutrophils.  Creatinine 0.74.  Hepatitis serology negative.  The other studies are

still pending.  Microbiology with thus far negative blood cultures, final

cryptococcus antigen was negative. 



ASSESSMENT AND PLAN:  Advanced human immunodeficiency virus infection with symptoms

including headaches and gastrointestinal symptoms and some element of wasting

syndrome, which seems to be improving rapidly with Cimduo and Bactrim prophylaxis.

I think we can withhold the spinal tap for now.  Continue treating.  May have to

order a spinal tap to evaluate CSF crypto antigen in other assays depending on

persistence of headache or not.  If he continues to improve this way, should be able

to be discharged on Cimduo and Bactrim soon. 







Job ID:  [id number]

## 2019-09-09 VITALS — DIASTOLIC BLOOD PRESSURE: 74 MMHG | SYSTOLIC BLOOD PRESSURE: 112 MMHG | TEMPERATURE: 97.5 F

## 2019-09-09 LAB
ALBUMIN SERPL ELPH-MCNC: 3.1 G/DL (ref 2.9–4.4)
ALBUMIN/GLOB SERPL: 0.5 {RATIO} (ref 0.7–1.7)
ALPHA1 GLOB SERPL ELPH-MCNC: 0.3 G/DL (ref 0–0.4)
ALPHA2 GLOB SERPL ELPH-MCNC: 1 G/DL (ref 0.4–1)
B-GLOBULIN SERPL ELPH-MCNC: 1.2 G/DL (ref 0.7–1.3)
BASOPHILS # BLD AUTO: 0 THOU/UL (ref 0–0.2)
BASOPHILS NFR BLD AUTO: 0.3 % (ref 0–1)
EOSINOPHIL # BLD AUTO: 0.2 THOU/UL (ref 0–0.7)
EOSINOPHIL NFR BLD AUTO: 8.1 % (ref 0–10)
GAMMA GLOB SERPL ELPH-MCNC: 3.6 G/DL (ref 0.4–1.8)
GLOBULIN SER CALC-MCNC: 6 G/DL (ref 2.2–3.9)
HGB BLD-MCNC: 10.4 G/DL (ref 14–18)
LYMPHOCYTES # BLD: 1 THOU/UL (ref 1.2–3.4)
LYMPHOCYTES NFR BLD AUTO: 34.3 % (ref 21–51)
MCH RBC QN AUTO: 29.1 PG (ref 27–31)
MCV RBC AUTO: 84.4 FL (ref 78–98)
MONOCYTES # BLD AUTO: 0.3 THOU/UL (ref 0.11–0.59)
MONOCYTES NFR BLD AUTO: 10.1 % (ref 0–10)
NEUTROPHILS # BLD AUTO: 1.3 THOU/UL (ref 1.4–6.5)
NEUTROPHILS NFR BLD AUTO: 47.2 % (ref 42–75)
PLATELET # BLD AUTO: 224 THOU/UL (ref 130–400)
RBC # BLD AUTO: 3.58 MILL/UL (ref 4.7–6.1)
WBC # BLD AUTO: 2.8 THOU/UL (ref 4.8–10.8)

## 2019-09-09 RX ADMIN — ALUMINUM ZIRCONIUM TRICHLOROHYDREX GLY SCH EACH: 0.2 STICK TOPICAL at 07:46

## 2019-09-09 NOTE — PDOC.FM
- Subjective


Subjective: 


He complains of night sweats. He was having them previously at home, but he 

feels like they have gotten worse. He says he has no headache. He is eating 

well and has no questions at this time. Dr. Booth came and spoke to him 

yesterday, and he has better understanding of what is going on today.





- Objective


MAR Reviewed: Yes


Vital Signs & Weight: 


 Vital Signs (12 hours)











  Temp Pulse Resp BP Pulse Ox


 


 09/08/19 20:04      99


 


 09/08/19 19:42  98.4 F  75  18  120/81  99








 Weight











Admit Weight                   71 kg


 


Weight                         71 kg














I&O: 


 











 09/07/19 09/08/19 09/09/19





 06:59 06:59 06:59


 


Intake Total 970 1080 


 


Balance 970 1080 











Result Diagrams: 


 09/06/19 07:20





 09/06/19 07:20





Phys Exam





- Physical Examination


Constitutional: NAD


HEENT: PERRLA, sclera anicteric


Neck: supple, full ROM


Respiratory: clear to auscultation bilateral


Cardiovascular: RRR, no significant murmur


Gastrointestinal: soft, non-tender, positive bowel sounds


Musculoskeletal: no edema, pulses present


Neurological: moves all 4 limbs


Psychiatric: normal affect, A&O x 3


Skin: no rash





Dx/Plan


(1) AIDS (acquired immune deficiency syndrome)


Status: Acute   





(2) HIV-1 (human immunodeficiency virus I)


Code(s): B20 - HUMAN IMMUNODEFICIENCY VIRUS [HIV] DISEASE   Status: Acute   





(3) Problems related to high-risk sexual behavior


Code(s): Z72.51 - HIGH RISK HETEROSEXUAL BEHAVIOR   Status: Acute   


Qualifiers: 


   High risk sexual behavior type: bisexual   Qualified Code(s): Z72.53 - High 

risk bisexual behavior   





(4) Superficial fungal infection of skin


Code(s): B36.9 - SUPERFICIAL MYCOSIS, UNSPECIFIED   Status: Chronic   





(5) Kaposi sarcoma


Code(s): C46.9 - KAPOSI'S SARCOMA, UNSPECIFIED   Status: Suspected   





- Plan


Plan: 


42 y/o M admitted to inpatient medical for evaluation of recent HIV diagnosis 

with low CD4 count.





1. HIV 


* Recent diagnosis, family is NOT aware of diagnosis but patient plans on 

telling them 


* CD4 count 36 at [facility] lab, labs now in patient's paper chart


* Has home Cimduo 300mg, started 9/6


* HIV genotyping in clinic shows pan-susceptible to anti-retrovirals





2. AIDS 


* CD4 count 36, viral count 310,000 per clinic labs, in paper chart


* afebrile overnight


* Bactrim MWF PPx 


* Serum protein electrophoresis


* CBC smear: normocytic normochromic anemia, some Pulger-Huet morphology and 

rare metamyelocyte left shift


* ESR elevated at 66


* CRP wnl at 0.5


* cryptococcus antigen negative


* CMV, AFB, histoplasma, Quant TB pending


* Dr. Booth, ID, has been consulted, appreciate recs


 * d/c azithromycin 9/6, transition Bactrim to MWF


 * MAC, AFP, PCR, histoplasma, cryptococcus, PCR, CMV, Toxo IgG


 * Possible spinal evaluation pending clinical progression and lab results, but 

will hold at this time


* Brain CT negative


* Blood Cx ngtd





3. Kaposi Sarcoma 


* suspected


* lesions scattered diffusely throughout back and arms, but significantly 

improving





4. High risk sexual behavior with men


* Hep B, Hep C negative


* RPR drawn in clinic, negative 


* HIV type 1 positive. 





5. Tinea Capitus 


* Fungal infection of scalp and beard skin. 


* Topical antifungal Nystatin cream 





Code Status: full code 


Diet: Regular diet 


DVT ppx: SCD's 


Dispo: Inpatient for HIV/AIDS related testing, following Dr. Booth recs. Will 

get a CBC this afternoon and D/C if he is doing well.





Addendum - Attending





- Attending Attestation


Date/Time: 09/09/19 4923





I personally evaluated the patient and discussed the management with Dr. Vann. 


I agree with the History, Examination, Assessment and Plan documented above 

with any addition or exceptions noted below.





Patient here for treatment of newly diagnosed advanced HIV/AIDS infection. He 

is currently tolerating HAART therapy well. No headaches today. Send out labs 

still pending. Discuss with ID but potential discharge with outpatient follow 

up as it does not appear the patient needs anything further inpatient.

## 2019-09-10 NOTE — DIS
DATE OF ADMISSION:  09/05/2019



DATE OF DISCHARGE:  09/09/2019



RESIDENT:  Ben Vann MD



ADMITTING ATTENDING: Josue Madrid MD



DISCHARGE ATTENDING: Josue Madrid MD



CONSULTS:  ID, Dr. Booth.



PROCEDURES:

Brain CT on admission shows no acute cranial abnormalities.



PRIMARY DIAGNOSIS:  

1. Acquired immunodeficiency syndrome.



SECONDARY DIAGNOSES:  

1. Kaposi sarcoma.

2. Tinea capitis.

3. Problems related to high-risk sexual behavior.



DISCHARGE MEDICATIONS:  

None.  He will continue on his home medications and change Bactrim to one 
tablet double strength Monday, Wednesday, Friday, and continue taking Cimduo 
one tablet daily. 



HISTORY OF PRESENT ILLNESS:  

Mr. Vinny Price is a 43-year-old  male, who was recently 
diagnosed with HIV and later found to have a CD4 count of 36 with a viral load 
of 310,000.  He presents to the ED after having gradually increased weakness 
and dizziness.  He complains of body aches, muscle cramps, constipation, nausea
, dizziness, shortness of breath, and rectal bleeding. He states he has lost 
weight over the last 6 months.



The patient found that he had HIV last Friday and he has begun to tell his 
family, but has still not told his mother yet.      



He has had sexual intercourse with both men and women and did not use condoms 
regularly.  He has informed his show partners of his recent diagnosis.  He was 
screened back in last in February 2019 with negative STI screening result, but

discovered his diagnosis last week with re-screening.  



The patient started retrovirals during his hospital stay on Friday.  He has 
also been taking Bactrim for PCP prophylaxis after chest x-ray did not reveal 
any infiltrate. 



He has had hepatitis screening that was negative.  His RPR is negative.  His HLA
-B 5701 is

negative.  His genotyping showed no resistance.



We are still waiting on TB and CMV and histo labs, but the patient is doing 
well.  He is eating.  He no longer has complaints of headache and he no longer 
has the symptoms that he was experiencing when he came in and he

will be discharged in stable condition. 



DISCHARGE INSTRUCTIONS:  

1. Location:  Home.

2. Diet:  Regular.

3. Activity:  As tolerated.

4. Followup:  

* Follow up with PCP in 7 days

* Follow up with Dr. Villa Booth, Infectious Disease doctor within 2 to 3 
weeks. 







Job ID:  [id number]



[facility]

## 2019-09-11 LAB
CMV DNA SERPL NAA+PROBE-ACNC: (no result) IU/ML
CMV DNA SERPL NAA+PROBE-LOG IU: (no result) {LOG_IU}/ML

## 2019-09-11 NOTE — PQF
JAMEY CHAWLA JASON MD  

[id number]                                                             [id number]
[id number]

[id number]                             

                                   

CLINICAL DOCUMENTATION CLARIFICATION FORM:  POST DISCHARGE



Addendum to original discharge summary date:  __________________________________
____



Late entry note date:  _________________________________________________________
__











Date:  09-                                                              
  ATTN: Slim Barone 



Please exercise your independent, professional judgment in responding to the 
clarification form. 

Clinical indicators are provided on the bottom of this form for your review



Can the nutritional status of the patient be further specify as:



Please check appropriate box(s):

[  ] Protein Calorie Malnutrition:    [  ] Mild      [  ] Moderate   [  ] 
Severe   

[  ] Other Malnutrition (please specify) _______________________________________
__

[  ] Underweight without malnutrition

[  ] Cachexia

[  ] Other diagnosis (please specify) ___________

[  ] Unable to determine





CLINICAL INDICATORS:

HP 09/05 pg1 Dr. Reyna having gradual increasing weakness and dizziness

HP 09/05 pg1 Dr. Reyna He has lost weight of 30 pounds

HP 09/05 pg6 Dr. Reyna  Progressive fatigue, night sweats, weight loss, 
anorexia body aches and intermittent fevers

HP 09/05 pg6 Dr. Reyna Will need dietary and nutrition recs as he has protein/
calorie malnutrition and cachexia/wasting from chronic disease

DS 09/09 pg1 Dr. Vann Acquired Immunodeficiency syndrome

DS 09/09 pg1 Dr. Vann He states he has lost weight over the last 6 mos

BMI=23.1 kg



RISK FACTORS

HP 09/05  Dr. Reyna- AIDS

HP 09/05  Dr. Reyna- Kaposi Sarcoma

HP 09/05  Dr. Reyna- Anemia

Consult Dr. Booth- evidence of advance immunosuppression





TREATMENT:

Dietary Consult

MAR  IV fluids 







Moderate Malnutrition (in acute illness)

Energy Intake: <75% of estimated energy requirement for > 7 days

Weight Loss:  1-2%/1 week; 5%/ 1 month; 7.5%/3 months

Other: mild body fat loss; mild muscle mass loss; mild fluid accumulation;

Severe Malnutrition (in acute illness)

Energy Intake: < 50% of estimated energy requirement for > 5 days

Weight Loss: >1-2%/1 week; >5%/1 month; >7.5%/3 months

Other: moderate body fat loss; moderate muscle mass loss; moderate- severe 
fluid accumulation; measurably reduced  strength

Moderate Malnutrition (in chronic illness)

Energy Intake: <75% of estimated energy requirement for >1 month

Weight Loss: 5%/1 month; 7.5%/3 months; 10%/6 months; 20%/1 year

Other: mild body fat loss; mild muscle mass loss; mild fluid accumulation

Severe Malnutrition (in chronic illness)

Energy Intake: <75% of estimated energy requirement for >1 month

Weight Loss: >5%/1 month; >7.5%/3 months; >10%/6 months; >20%/1 year

Other: severe body fat loss; severe muscle mass loss; severe fluid accumulation
; measurably reduced  strength



(This form is maintained as a part of the permanent medical record)

2015 [employer]  All Rights Reserved

[email or web address]    [not provided]

                                                              



MTDD

## 2019-10-07 ENCOUNTER — HOSPITAL ENCOUNTER (OUTPATIENT)
Dept: HOSPITAL 92 - RAD | Age: 44
Discharge: HOME | End: 2019-10-07
Attending: INTERNAL MEDICINE
Payer: COMMERCIAL

## 2019-10-07 VITALS — BODY MASS INDEX: 23.6 KG/M2

## 2019-10-07 DIAGNOSIS — R51: Primary | ICD-10-CM

## 2019-10-07 DIAGNOSIS — R42: ICD-10-CM

## 2019-10-07 DIAGNOSIS — Z79.899: ICD-10-CM

## 2019-10-07 DIAGNOSIS — Z21: ICD-10-CM

## 2019-10-07 DIAGNOSIS — R63.4: ICD-10-CM

## 2019-10-07 LAB
COLOR CSF: COLORLESS
COLOR CSF: COLORLESS
GLUCOSE CSF-MCNC: 51 MG/DL (ref 40–70)
PROT CSF-MCNC: 74 MG/DL (ref 15–40)
SPECIMEN SOURCE FLD: (no result)

## 2019-10-07 PROCEDURE — A9577 INJ MULTIHANCE: HCPCS

## 2019-10-07 PROCEDURE — 009U3ZZ DRAINAGE OF SPINAL CANAL, PERCUTANEOUS APPROACH: ICD-10-PCS | Performed by: RADIOLOGY

## 2019-10-07 PROCEDURE — 86592 SYPHILIS TEST NON-TREP QUAL: CPT

## 2019-10-07 PROCEDURE — B01BZZZ FLUOROSCOPY OF SPINAL CORD: ICD-10-PCS | Performed by: RADIOLOGY

## 2019-10-07 PROCEDURE — 87899 AGENT NOS ASSAY W/OPTIC: CPT

## 2019-10-07 PROCEDURE — 70553 MRI BRAIN STEM W/O & W/DYE: CPT

## 2019-10-07 PROCEDURE — 82945 GLUCOSE OTHER FLUID: CPT

## 2019-10-07 PROCEDURE — 89051 BODY FLUID CELL COUNT: CPT

## 2019-10-07 PROCEDURE — 62270 DX LMBR SPI PNXR: CPT

## 2019-10-07 PROCEDURE — 84157 ASSAY OF PROTEIN OTHER: CPT

## 2019-10-07 NOTE — MRI
MRI BRAIN WITH AND WITHOUT IV CONTRAST:

 

Date:  10/07/19 

 

HISTORY:  

Headaches. 

 

FINDINGS:

 

Correlation is made with the CT scan of 09/05/19. 

 

No restricted diffusion is seen. No evidence of infarct, hemorrhage, mass, midline shift, or abnormal
 extra-axial fluid collections are noted. The ventricular size is normal and the basilar cisterns are
 patent. No abnormal postcontrast enhancement is seen. There is fluid in the right mastoid air cells.
 

 

IMPRESSION: 

1.  Normal MRI of the brain. 

2.  Right mastoid effusion. 

 

 

POS: OFF

## 2019-10-07 NOTE — RAD
Fluoroscopic guided lumbar puncture



HISTORY: Headache. Dizziness. HIV.



FINDINGS: After explaining the procedure and answering all questions, the lower back was prepped and 
draped in usual sterile fashion. Sterile technique, buffered local anesthesia, fluoroscopic

guidance, and a left posterolateral L2-3 approach were used to carefully advance the tip of a 20-gaug
e spinal needle into the thecal sac. A total volume of 15.5 cc clear CSF was collected in 4 sterile

tubes and sent to laboratory for analysis. Needle was removed. Patient tolerated the procedure well a
nd was transferred to MRI in good condition.



Fluoroscopy time 0.2 minutes.











IMPRESSION: Technically successful sonographic guided lumbar puncture.



Reported By: EDGAR Beatty 

Electronically Signed:  10/7/2019 9:58 AM

## 2022-05-23 NOTE — PDOC.EVN
Event Note





- Event Note


Event Note: 


Mr. Price is a 42 yo M with a history of rectal bleeding and constipation who 

presents, due to weakness and dizziness 2/2 new HIV diagnosis. 





He was scheduled for a colonoscopy this past Wednesday, but it was unable to be 

performed. The patient was unable to complete the prep and he developed anal 

fissures. I reviewed his labs and they are down trending. The primary team is 

monitoring at this time. We will reschedule his colonoscopy at a later time. 





His Cimduo was ordered last Friday, but he did not receive the medication till 

this Tuesday. He did not start the medication. I spoke to him this morning 

about the importance of taking the medication. He was not taking the medication

, due to the fact that he was feeling so weak and dizzy. He started Bactrim 

last Friday. He is currently taking Bactrim DS BID, but after 2 weeks should be 

cut down to the prophylactic dosing of Bactrim DS once daily. The primary team 

has added Azithro for prophylaxis for possible MAC due to his CD4 count of 36. 

They are also getting TB testing. Also, they are working him up with [facility] for 

elevated protein. Dr. Booth has been contacted, appreciate his recommendations.





He says he has been eating better, since he received the Zofran for his nausea.





His wife passed away a couple of years ago from an aneurysm. He is a single 

father to a 10 year old. He has not told his family at this time about his 

diagnosis. He says he plans to talk to them today. His mother is his primary 

social support at this time. He was distraught upon receiving the diagnosis 

last Friday, but he seems to be doing better today.





I agree with the teams current management. I will follow along.
independent